# Patient Record
Sex: MALE | Race: OTHER | Employment: FULL TIME | URBAN - METROPOLITAN AREA
[De-identification: names, ages, dates, MRNs, and addresses within clinical notes are randomized per-mention and may not be internally consistent; named-entity substitution may affect disease eponyms.]

---

## 2018-09-18 ENCOUNTER — HOSPITAL ENCOUNTER (EMERGENCY)
Age: 38
Discharge: HOME | End: 2018-09-18
Attending: EMERGENCY MEDICINE
Payer: SUBSIDIZED

## 2018-09-18 VITALS
HEIGHT: 66 IN | OXYGEN SATURATION: 98 % | SYSTOLIC BLOOD PRESSURE: 132 MMHG | HEART RATE: 93 BPM | WEIGHT: 180 LBS | DIASTOLIC BLOOD PRESSURE: 92 MMHG | BODY MASS INDEX: 28.93 KG/M2 | RESPIRATION RATE: 14 BRPM | TEMPERATURE: 98.7 F

## 2018-09-18 DIAGNOSIS — H00.025 HORDEOLUM INTERNUM LEFT LOWER EYELID: Primary | ICD-10-CM

## 2018-09-18 PROCEDURE — 99283 EMERGENCY DEPT VISIT LOW MDM: CPT

## 2018-09-18 PROCEDURE — 74011000250 HC RX REV CODE- 250: Performed by: PHYSICIAN ASSISTANT

## 2018-09-18 RX ORDER — PROPARACAINE HYDROCHLORIDE 5 MG/ML
1 SOLUTION/ DROPS OPHTHALMIC
Status: COMPLETED | OUTPATIENT
Start: 2018-09-18 | End: 2018-09-18

## 2018-09-18 RX ORDER — ERYTHROMYCIN 5 MG/G
OINTMENT OPHTHALMIC
Qty: 1 G | Refills: 0 | Status: SHIPPED | OUTPATIENT
Start: 2018-09-18 | End: 2018-09-25

## 2018-09-18 RX ADMIN — PROPARACAINE HYDROCHLORIDE 1 DROP: 5 SOLUTION/ DROPS OPHTHALMIC at 17:54

## 2018-09-18 RX ADMIN — FLUORESCEIN SODIUM 1 STRIP: 0.6 STRIP OPHTHALMIC at 17:54

## 2018-09-18 NOTE — DISCHARGE INSTRUCTIONS
Arielle Bolanos y chalaziones: Instrucciones de cuidado - [ Styes and Chalazia: Care Instructions ]  Instrucciones de cuidado    Los orzuelos y chalaziones (calacios) son afecciones que pueden causar hinchazón del párpado. Un orzuelo es nkechi infección en la raíz de nkechi pestaña. La infección causa un bulto sensible y patrick en el borde del párpado. La infección se puede extender hasta que todo el párpado se ponga patrick y se inflame. El orzuelo por lo general revienta y escurre nkechi pequeña cantidad de pus. Por lo general desaparecen por sí mismos en nkechi semana más o menos, delbert en ocasiones requieren tratamiento con antibióticos. Un chalazión es un bulto o quiste en el párpado. Es provocado por la hinchazón e inflamación de las glándulas sebáceas profundas que están dentro del párpado. Los chalaziones no suelen infectarse. Pueden baldev unos meses para sanar. Si el chalazión se inflama y duele más o si no desaparece, es posible que necesite ser drenado por un médico.  La atención de seguimiento es nkechi parte clave de villafana tratamiento y seguridad. Asegúrese de hacer y acudir a todas las citas, y llame a villafana médico si está teniendo problemas. También es nkechi buena idea saber los resultados de mona exámenes y mantener nkechi lista de los medicamentos que daryl. ¿Cómo puede cuidarse en el hogar? · No se restriegue los ojos. No se exprima ni trate de abrir un orzuelo o un chalazión. · Para ayudar a que sane más pronto un orzuelo o chalazión:  ¨ Póngase nkechi compresa húmeda y tibia en el flako jaylen 5 a 10 minutos, 3 a 6 veces al día. El calor a menudo lleva al orzuelo a un punto en que se drena por sí solo. Tenga en cuenta que la aplicación de compresas tibias muchas veces aumenta un poco la hinchazón al principio. ¨ No use [place], ni caliente nkechi toallita húmeda en el horno microondas. La compresa podría calentarse demasiado y quemarle el párpado.   · Lávese siempre las louie antes y después de usar nkechi compresa o [employer] ojos.  · Si el médico le marixa gotas o un ungüento antibióticos, úselos exactamente luis se lo haya indicado. Use el medicamento todo el tiempo indicado, aunque el flako empiece a sentirse mejor. · Para ponerse ungüento o gotas para los ojos:  ¨ Incline la [place] atrás y, con un dedo, baje el párpado inferior. ¨ Deje caer las gotas o un chorrito del medicamento dentro del párpado inferior. ¨ Cierre el flako jaylen 30 a 61 segundos para permitir que las gotas o el ungüento se esparzan. ¨ No permita que la punta del ungüento o del gotero toque mona pestañas ni otra superficie. · No utilice maquillaje en los ojos ni lentes de contacto hasta que el orzuelo o el chalazión hayan sanado. · Mientras tenga el orzuelo no comparta toallas, almohadas ni toallitas para la steph. ¿Cuándo debe pedir ayuda? Llame a villafana médico ahora mismo o busque atención médica inmediata si:    · Siente dolor en el flako.     · Tiene cambios o pérdida de la visión.     · El enrojecimiento y la hinchazón empeoran mucho.    Preste especial atención a los cambios en villafana marilee y asegúrese de comunicarse con villafana médico si:    · El orzuelo no mejora en 1 semana.     · El chalazión no empieza a mejorar después de varias semanas. ¿Dónde puede encontrar más información en inglés? Angelo Patel a http://diana-elda.info/. Laura Lambert J502 en la búsqueda para aprender más acerca de \"Orzuelos y chalaziones: Instrucciones de cuidado - [ Styes and Chalazia: Care Instructions ]. \"  Revisado: 3 diciembre, 2017  Versión del contenido: 11.7  © 5501-2894 [employer]. Las instrucciones de cuidado fueron adaptadas bajo licencia por [employer] (which disclaims liability or warranty for this information). Si usted tiene [place] afección médica o sobre estas instrucciones, siempre pregunte a villafana profesional de marilee. [employer] Inc[employer] niega toda garantía o responsabilidad por villafana uso de esta información.

## 2018-09-18 NOTE — ED PROVIDER NOTES
EMERGENCY DEPARTMENT HISTORY AND PHYSICAL EXAM 
 
5:29 PM 
 
 
Date: 9/18/2018 Patient Name: Kavitha Thomason History of Presenting Illness Chief Complaint Patient presents with  Eye Pain History Provided By: Patient Chief Complaint: left eye pain Duration: 2 Days Timing:  Acute Location: left eye Quality: Burning and irritation Severity: 5 out of 10 Modifying Factors: worse when bending over or outside Associated Symptoms: watering, red Additional History (Context): Kavitha Thomason is a 41 y.o. male with No significant past medical history who presents with left eye pain and redness. He works outside and feels like he got something in his eye. He denies vision loss or blurred vision. He does not wear contacts or glasses. PCP: None Past History Past Medical History: 
History reviewed. No pertinent past medical history. Past Surgical History: 
History reviewed. No pertinent surgical history. Family History: 
History reviewed. No pertinent family history. Social History: 
Social History Substance Use Topics  Smoking status: None  Smokeless tobacco: None  Alcohol use None Allergies: 
No Known Allergies Review of Systems Review of Systems Constitutional: Negative for fever. HENT: Negative for facial swelling. Eyes: Positive for pain, discharge and redness. Negative for photophobia, itching and visual disturbance. Respiratory: Negative for shortness of breath. Cardiovascular: Negative for chest pain. Gastrointestinal: Negative for abdominal pain. Genitourinary: Negative for dysuria. Musculoskeletal: Negative for neck pain. Skin: Negative for rash. Neurological: Negative for dizziness. Psychiatric/Behavioral: Negative for confusion. All other systems reviewed and are negative. Physical Exam  
 
Visit Vitals  BP (!) 132/92 (BP 1 Location: Left arm, BP Patient Position: Sitting)  Pulse 93  Temp 98.7 °F (37.1 °C)  Resp 14  
 Ht 5' 6\" (1.676 m)  Wt 81.6 kg (180 lb)  SpO2 98%  BMI 29.05 kg/m2 Physical Exam  
Constitutional: He appears well-developed and well-nourished. No distress. HENT:  
Head: Normocephalic and atraumatic. Eyes: Conjunctivae and EOM are normal. Pupils are equal, round, and reactive to light. Right eye exhibits no discharge. Left eye exhibits no discharge. Left eye conjunctival injection and mild lower lid blepharitis Left inner lower lid small sty present. No corneal abrasion visible on fluorescien stain. Neck: Normal range of motion. Neck supple. Cardiovascular: Normal rate. Pulmonary/Chest: Effort normal.  
Abdominal: Soft. Musculoskeletal: Normal range of motion. Neurological: He is alert. Skin: Skin is warm and dry. He is not diaphoretic. Psychiatric: He has a normal mood and affect. Nursing note and vitals reviewed. Diagnostic Study Results Labs - No results found for this or any previous visit (from the past 12 hour(s)). Radiologic Studies - No orders to display Medical Decision Making I am the first provider for this patient. I reviewed the vital signs, available nursing notes, past medical history, past surgical history, family history and social history. Vital Signs-Reviewed the patient's vital signs. Records Reviewed: Nursing Notes (Time of Review: 5:29 PM) ED Course: Progress Notes, Reevaluation, and Consults: 
 
 
Provider Notes (Medical Decision Making): MDM Number of Diagnoses or Management Options Hordeolum internum left lower eyelid:  
Diagnosis management comments: Left lower lid sty. No signs of abrasion. Eye Stain 
 
Date/Time: 9/18/2018 6:53 PM 
 
Performed by: PA 
Supervising provider: [place] Slim Corneal abrasion was not present on eyelid eversion. Cornea is clear. Anterior chamber is clear. Patient tolerance: Patient tolerated the procedure well with no immediate complications My total time at bedside, performing this procedure was 1-15 minutes. Diagnosis Clinical Impression: 1. Hordeolum internum left lower eyelid Disposition:  
 
Follow-up Information Follow up With Details Comments Contact [facility] EMERGENCY DEPT In 1 week If symptoms do not improve [address] [address] [phone] [address] [facility] EMERGENCY DEPT  Immediately if symptoms worsen [address] [address] [phone] Patient's Medications Start Taking ERYTHROMYCIN (ILOTYCIN) OPHTHALMIC OINTMENT    One half inch (1.25cm) four times daily to affected eye for 5-7 days until symptoms improve Continue Taking No medications on file These Medications have changed No medications on file Stop Taking No medications on file  
 
_______________________________ Attestations: 
Scribe Attestation Humza REMIGIO Baldwin PA-C acting as a scribe for and in the presence of Zoraida [place] [employer] September 18, 2018 at [facility] PM 
    
Provider Attestation:     
I personally performed the services described in the documentation, reviewed the documentation, as recorded by the scribe in my presence, and it accurately and completely records my words and actions. September 18, 2018 at 6:54 PM - SP [place] 
_______________________________

## 2024-05-05 ENCOUNTER — APPOINTMENT (OUTPATIENT)
Facility: HOSPITAL | Age: 44
End: 2024-05-05

## 2024-05-05 ENCOUNTER — HOSPITAL ENCOUNTER (EMERGENCY)
Facility: HOSPITAL | Age: 44
Discharge: HOME | End: 2024-05-05
Attending: STUDENT IN AN ORGANIZED HEALTH CARE EDUCATION/TRAINING PROGRAM

## 2024-05-05 VITALS
RESPIRATION RATE: 16 BRPM | HEART RATE: 78 BPM | HEIGHT: 62 IN | BODY MASS INDEX: 33.13 KG/M2 | SYSTOLIC BLOOD PRESSURE: 135 MMHG | DIASTOLIC BLOOD PRESSURE: 76 MMHG | OXYGEN SATURATION: 100 % | TEMPERATURE: 98.5 F | WEIGHT: 180 LBS

## 2024-05-05 DIAGNOSIS — R13.10 ODYNOPHAGIA: ICD-10-CM

## 2024-05-05 DIAGNOSIS — M54.2 NECK PAIN: Primary | ICD-10-CM

## 2024-05-05 LAB
ANION GAP SERPL CALC-SCNC: 7 MMOL/L (ref 3–18)
BASOPHILS # BLD: 0.1 K/UL (ref 0–0.1)
BASOPHILS NFR BLD: 0 % (ref 0–2)
BUN SERPL-MCNC: 12 MG/DL (ref 7–18)
BUN/CREAT SERPL: 13 (ref 12–20)
CALCIUM SERPL-MCNC: 8.8 MG/DL (ref 8.5–10.1)
CHLORIDE SERPL-SCNC: 110 MMOL/L (ref 100–111)
CO2 SERPL-SCNC: 24 MMOL/L (ref 21–32)
CREAT SERPL-MCNC: 0.93 MG/DL (ref 0.6–1.3)
DIFFERENTIAL METHOD BLD: NORMAL
EOSINOPHIL # BLD: 0.1 K/UL (ref 0–0.4)
EOSINOPHIL NFR BLD: 1 % (ref 0–5)
ERYTHROCYTE [DISTWIDTH] IN BLOOD BY AUTOMATED COUNT: 12.9 % (ref 11.6–14.5)
GLUCOSE SERPL-MCNC: 145 MG/DL (ref 74–99)
HCT VFR BLD AUTO: 40.5 % (ref 36–48)
HGB BLD-MCNC: 14.5 G/DL (ref 13–16)
IMM GRANULOCYTES # BLD AUTO: 0 K/UL (ref 0–0.04)
IMM GRANULOCYTES NFR BLD AUTO: 0 % (ref 0–0.5)
LYMPHOCYTES # BLD: 2.9 K/UL (ref 0.9–3.6)
LYMPHOCYTES NFR BLD: 26 % (ref 21–52)
MCH RBC QN AUTO: 29.3 PG (ref 24–34)
MCHC RBC AUTO-ENTMCNC: 35.8 G/DL (ref 31–37)
MCV RBC AUTO: 81.8 FL (ref 78–100)
MONOCYTES # BLD: 0.5 K/UL (ref 0.05–1.2)
MONOCYTES NFR BLD: 5 % (ref 3–10)
NEUTS SEG # BLD: 7.6 K/UL (ref 1.8–8)
NEUTS SEG NFR BLD: 68 % (ref 40–73)
NRBC # BLD: 0 K/UL (ref 0–0.01)
NRBC BLD-RTO: 0 PER 100 WBC
PLATELET # BLD AUTO: 245 K/UL (ref 135–420)
PMV BLD AUTO: 10.4 FL (ref 9.2–11.8)
POTASSIUM SERPL-SCNC: 3.6 MMOL/L (ref 3.5–5.5)
RBC # BLD AUTO: 4.95 M/UL (ref 4.35–5.65)
SODIUM SERPL-SCNC: 141 MMOL/L (ref 136–145)
WBC # BLD AUTO: 11.3 K/UL (ref 4.6–13.2)

## 2024-05-05 PROCEDURE — 6360000004 HC RX CONTRAST MEDICATION: Performed by: STUDENT IN AN ORGANIZED HEALTH CARE EDUCATION/TRAINING PROGRAM

## 2024-05-05 PROCEDURE — 99285 EMERGENCY DEPT VISIT HI MDM: CPT

## 2024-05-05 PROCEDURE — 70498 CT ANGIOGRAPHY NECK: CPT

## 2024-05-05 PROCEDURE — 6370000000 HC RX 637 (ALT 250 FOR IP): Performed by: STUDENT IN AN ORGANIZED HEALTH CARE EDUCATION/TRAINING PROGRAM

## 2024-05-05 PROCEDURE — 2580000003 HC RX 258: Performed by: STUDENT IN AN ORGANIZED HEALTH CARE EDUCATION/TRAINING PROGRAM

## 2024-05-05 PROCEDURE — 85025 COMPLETE CBC W/AUTO DIFF WBC: CPT

## 2024-05-05 PROCEDURE — 80048 BASIC METABOLIC PNL TOTAL CA: CPT

## 2024-05-05 RX ORDER — ACETAMINOPHEN 500 MG
1000 TABLET ORAL 4 TIMES DAILY PRN
Qty: 30 TABLET | Refills: 0 | Status: SHIPPED | OUTPATIENT
Start: 2024-05-05

## 2024-05-05 RX ORDER — ACETAMINOPHEN 500 MG
1000 TABLET ORAL
Status: COMPLETED | OUTPATIENT
Start: 2024-05-05 | End: 2024-05-05

## 2024-05-05 RX ORDER — NAPROXEN 500 MG/1
500 TABLET ORAL 2 TIMES DAILY
Qty: 10 TABLET | Refills: 0 | Status: SHIPPED | OUTPATIENT
Start: 2024-05-05 | End: 2024-05-10

## 2024-05-05 RX ORDER — 0.9 % SODIUM CHLORIDE 0.9 %
1000 INTRAVENOUS SOLUTION INTRAVENOUS ONCE
Status: COMPLETED | OUTPATIENT
Start: 2024-05-05 | End: 2024-05-05

## 2024-05-05 RX ORDER — CYCLOBENZAPRINE HCL 10 MG
10 TABLET ORAL NIGHTLY PRN
Qty: 6 TABLET | Refills: 0 | Status: SHIPPED | OUTPATIENT
Start: 2024-05-05 | End: 2024-05-15

## 2024-05-05 RX ADMIN — IOPAMIDOL 100 ML: 755 INJECTION, SOLUTION INTRAVENOUS at 21:26

## 2024-05-05 RX ADMIN — SODIUM CHLORIDE 1000 ML: 9 INJECTION, SOLUTION INTRAVENOUS at 19:59

## 2024-05-05 RX ADMIN — ACETAMINOPHEN 1000 MG: 500 TABLET ORAL at 19:59

## 2024-05-05 ASSESSMENT — LIFESTYLE VARIABLES
HOW OFTEN DO YOU HAVE A DRINK CONTAINING ALCOHOL: NEVER
HOW OFTEN DO YOU HAVE A DRINK CONTAINING ALCOHOL: NEVER
HOW MANY STANDARD DRINKS CONTAINING ALCOHOL DO YOU HAVE ON A TYPICAL DAY: PATIENT DOES NOT DRINK

## 2024-05-05 ASSESSMENT — PAIN SCALES - GENERAL
PAINLEVEL_OUTOF10: 4
PAINLEVEL_OUTOF10: 9

## 2024-05-05 ASSESSMENT — PAIN DESCRIPTION - LOCATION: LOCATION: NECK

## 2024-05-05 NOTE — ED TRIAGE NOTES
Pt to ED for eval of neck pain x 2 weeks. Pt reports approximately 2 weeks ago he had to make a sudden jerking movement at work to get out of the way of some water, he heard/felt a crack in his neck. Pt reports gradually since then, he has been having more and more pain, increased with movement, has been unable to turn his head, and is now having difficulty swallowing. Denies numbness/tingling to BUE. Maintaining oral secretions. Speaking in complete sentences.

## 2024-05-06 NOTE — DISCHARGE INSTRUCTIONS
You came to the ER with concerns of neck pain as well as some pain with swallowing.  We performed a CT scan of your neck and specifically of the arteries in your neck.  We did not see any abnormalities.  There is a possibility that you have a muscle strain as the cause of your symptoms.  Please try the medications that we prescribed to see if this helps.  If you are develop any new or worsening symptoms please come back to the nearest ER right away for repeat evaluation.

## 2024-05-06 NOTE — ED NOTES
This nurse called [facility] for a read on a CTA of Head with contrast per-order Dr. De La Fuente.

## 2024-05-06 NOTE — ED PROVIDER NOTES
81.6 kg (180 lb)      Height 1.575 m (5' 2\")      Head Circumference       Peak Flow       Pain Score       Pain Loc       Pain Edu?       Excl. in GC?               Physical Exam  Vitals and nursing note reviewed.   Constitutional:       Appearance: Normal appearance.   HENT:      Head: Normocephalic and atraumatic.      Right Ear: Tympanic membrane normal.      Left Ear: Tympanic membrane normal.      Mouth/Throat:      Mouth: Mucous membranes are moist.   Eyes:      Extraocular Movements: Extraocular movements intact.      Pupils: Pupils are equal, round, and reactive to light.   Cardiovascular:      Rate and Rhythm: Normal rate and regular rhythm.   Abdominal:      General: Abdomen is flat.      Tenderness: There is no abdominal tenderness.   Musculoskeletal:      Cervical back: Normal range of motion and neck supple.   Skin:     General: Skin is warm and dry.   Neurological:      Mental Status: He is alert.      Comments: Extraocular movements intact without nystagmus.  No facial droop.  Sensation intact to light touch throughout the face bilaterally.  Normal nose finger-nose testing.  Normal gait.          DIAGNOSTIC RESULTS   LABS:     Recent Results (from the past 24 hour(s))   CBC with Auto Differential    Collection Time: 05/05/24  8:00 PM   Result Value Ref Range    WBC 11.3 4.6 - 13.2 K/uL    RBC 4.95 4.35 - 5.65 M/uL    Hemoglobin 14.5 13.0 - 16.0 g/dL    Hematocrit 40.5 36.0 - 48.0 %    MCV 81.8 78.0 - 100.0 FL    MCH 29.3 24.0 - 34.0 PG    MCHC 35.8 31.0 - 37.0 g/dL    RDW 12.9 11.6 - 14.5 %    Platelets 245 135 - 420 K/uL    MPV 10.4 9.2 - 11.8 FL    Nucleated RBCs 0.0 0  WBC    nRBC 0.00 0.00 - 0.01 K/uL    Neutrophils % 68 40 - 73 %    Lymphocytes % 26 21 - 52 %    Monocytes % 5 3 - 10 %    Eosinophils % 1 0 - 5 %    Basophils % 0 0 - 2 %    Immature Granulocytes % 0 0.0 - 0.5 %    Neutrophils Absolute 7.6 1.8 - 8.0 K/UL    Lymphocytes Absolute 2.9 0.9 - 3.6 K/UL    Monocytes Absolute 0.5